# Patient Record
Sex: FEMALE | Race: WHITE | NOT HISPANIC OR LATINO | Employment: FULL TIME | URBAN - METROPOLITAN AREA
[De-identification: names, ages, dates, MRNs, and addresses within clinical notes are randomized per-mention and may not be internally consistent; named-entity substitution may affect disease eponyms.]

---

## 2017-05-18 DIAGNOSIS — R92.2 INCONCLUSIVE MAMMOGRAM: ICD-10-CM

## 2017-05-18 DIAGNOSIS — Z12.31 ENCOUNTER FOR SCREENING MAMMOGRAM FOR MALIGNANT NEOPLASM OF BREAST: ICD-10-CM

## 2017-07-19 ENCOUNTER — ALLSCRIPTS OFFICE VISIT (OUTPATIENT)
Dept: OTHER | Facility: OTHER | Age: 62
End: 2017-07-19

## 2018-01-10 NOTE — MISCELLANEOUS
Message   Recorded as Task   Date: 05/18/2016 09:17 AM, Created By: Jose Junior   Task Name: Miscellaneous   Assigned To: Orest File   Regarding Patient: Juan Cole, Status: Active   Comment:    Jose Junior - 18 May 2016 9:17 AM     TASK CREATED  Breast center called  Pt needs script for l diag mammo and l u/s as well as r routine mammo  Scripts entered        Active Problems    1  Acute breast pain (611 71,338 19) (N64 4,R52)   2  Acute bronchitis (466 0) (J20 9)   3  Diabetes Mellitus (250 00)   4  Diverticulitis of colon (562 11) (K57 32)   5  Encounter for examination following surgery (V67 00) (Z09)   6  Encounter for routine gynecological examination (V72 31) (Z01 419)   7  Encounter for screening mammogram for malignant neoplasm of breast (V76 12)   (Z12 31)   8  Hypertension (401 9) (I10)   9  Hypothyroidism (244 9) (E03 9)   10  Morbid or severe obesity due to excess calories (278 01) (E66 01)   11  Post-menopausal bleeding (627 1) (N95 0)   12  Premenopausal menorrhagia (627 0) (N92 4)    Current Meds   1  Acetaminophen Extra Strength 500 MG Oral Tablet; Therapy: 08Eqq8809 to Recorded   2  Ibuprofen 600 MG Oral Tablet; Therapy: 94Ccc2075 to Recorded   3  Losartan Potassium-HCTZ 50-12 5 MG Oral Tablet; Therapy: 42HUZ1892 to (Last Rx:29Jan2012)  Requested for: 35TKD0079 Ordered   4  Synthroid 112 MCG Oral Tablet (Levothyroxine Sodium); Therapy: 90UXQ4498 to (Last Rx:29Jan2012)  Requested for: 39SER7138 Ordered    Allergies    1  No Known Drug Allergies    Plan  Acute breast pain    · *US BREAST LEFT LIMITED (DIAGNOSTIC); Status:Hold For - [employer]; Requested for:54Ktm8496;    · MAMMO DIAGNOSTIC LEFT W CAD; Status:Hold For - Scheduling,Retrospective  Authorization;  Requested for:72Bme8549;   Acute breast pain, Encounter for screening mammogram for malignant neoplasm of  breast    · MAMMO SCREENING RIGHT W CAD; Status:Hold For - Scheduling,Retrospective  Authorization; Requested for:27Mgn1462;     Signatures   Electronically signed by :  Kaylin Quevedo, ; May 18 2016  9:17AM EST                       (Author)

## 2018-01-11 NOTE — MISCELLANEOUS
Message   Recorded as Task   Date: 11/21/2016 10:31 AM, Created By: Gisela King   Task Name: Go to Result   Assigned To: Alexandro Oliveros   Regarding Patient: Carolina Cruz, Status: In Progress   RashmiLauren Cristina - 21 Nov 2016 10:31 AM     TASK CREATED  please call Neena Tillman- cyst has diminished  needs a routine screening mammogram in 6 months  please order and mail slip   Giselle Anderson - 21 Nov 2016 10:34 AM     TASK IN PROGRESS   Giselle Anderson - 21 Nov 2016 11:11 AM     TASK EDITED  Pt aware   Giselle Anderson - 21 Nov 2016 11:20 AM     TASK EDITED  Mammo slip sent to pt        Active Problems    1  Acute breast pain (611 71,338 19) (N64 4,R52)   2  Acute bronchitis (466 0) (J20 9)   3  Breast cyst (610 0) (N60 09)   4  Diabetes Mellitus (250 00)   5  Diverticulitis of colon (562 11) (K57 32)   6  Encounter for examination following surgery (V67 00) (Q41)   7  Encounter for routine gynecological examination (V72 31) (Z01 419)   8  Encounter for screening mammogram for malignant neoplasm of breast (V76 12)   (Z12 31)   9  Hypertension (401 9) (I10)   10  Hypothyroidism (244 9) (E03 9)   11  Morbid or severe obesity due to excess calories (278 01) (E66 01)   12  Post-menopausal bleeding (627 1) (N95 0)   13  Premenopausal menorrhagia (627 0) (N92 4)    Current Meds   1  Acetaminophen Extra Strength 500 MG Oral Tablet; Therapy: 99Tfz1597 to Recorded   2  Synthroid 112 MCG Oral Tablet (Levothyroxine Sodium); Therapy: 85BLA4020 to (Last Rx:29Jan2012)  Requested for: 52VQA9136 Ordered    Allergies    1  No Known Drug Allergies    Plan  Encounter for screening mammogram for malignant neoplasm of breast    · * MAMMO SCREENING BILATERAL W CAD; Status:Hold For - Scheduling,Retrospective  Authorization; Requested for:[id number];     Signatures   Electronically signed by :  Santa Quevedo, ; Nov 21 2016 11:20AM EST                       (Author)

## 2018-01-13 VITALS
HEIGHT: 59 IN | SYSTOLIC BLOOD PRESSURE: 136 MMHG | DIASTOLIC BLOOD PRESSURE: 74 MMHG | BODY MASS INDEX: 35.08 KG/M2 | WEIGHT: 174 LBS

## 2018-01-17 NOTE — MISCELLANEOUS
Message   Recorded as Task   Date: 05/23/2016 11:16 AM, Created By: Luís Barron   Task Name: Follow Up   Assigned To: Elidia Mcneill   Regarding Patient: Krissy Kam, Status: Active   CommentVance Jain - 23 May 2016 11:16 AM     TASK CREATED  Caller: Self; [phone] (Home); [phone] (Work)  pt said she is returning a call from [employer] [place] 73 sure who called pt i presume it was for results,no active tasks her phone [facility] - 23 May 2016 11:30 AM     TASK EDITED   Vitor Eve - 23 May 2016 11:30 AM     TASK EDITED  lmtcb   Vitor Eve - 23 May 2016 11:39 AM     TASK IN PROGRESS   Vannesa Baker - 24 May 2016 8:19 AM     TASK EDITED        Active Problems    1  Acute breast pain (611 71,338 19) (N64 4,R52)   2  Acute bronchitis (466 0) (J20 9)   3  Breast cyst (610 0) (N60 09)   4  Diabetes Mellitus (250 00)   5  Diverticulitis of colon (562 11) (K57 32)   6  Encounter for examination following surgery (V67 00) (B90)   7  Encounter for routine gynecological examination (V72 31) (Z01 419)   8  Encounter for screening mammogram for malignant neoplasm of breast (V76 12)   (Z12 31)   9  Hypertension (401 9) (I10)   10  Hypothyroidism (244 9) (E03 9)   11  Morbid or severe obesity due to excess calories (278 01) (E66 01)   12  Post-menopausal bleeding (627 1) (N95 0)   13  Premenopausal menorrhagia (627 0) (N92 4)    Current Meds   1  Acetaminophen Extra Strength 500 MG Oral Tablet; Therapy: 38Kiv1927 to Recorded   2  Ibuprofen 600 MG Oral Tablet; Therapy: 94Qde3602 to Recorded   3  Losartan Potassium-HCTZ 50-12 5 MG Oral Tablet; Therapy: 66BRZ0523 to (Last Rx:29Jan2012)  Requested for: 10JLJ4283 Ordered   4  Synthroid 112 MCG Oral Tablet (Levothyroxine Sodium); Therapy: 98TOX9628 to (Last Rx:29Jan2012)  Requested for: 91FQA1897 Ordered    Allergies    1   No Known Drug Allergies    Signatures   Electronically signed by : Sarina Martinez LPN; [place]  1 6540 11:00EK EST                       (Author)

## 2018-01-18 NOTE — MISCELLANEOUS
Message   Recorded as Task   Date: 05/16/2016 09:49 AM, Created By: Selena Sandoval   Task Name: Follow Up   Assigned To: Whit Bender   Regarding Patient: Nury Cardona, Status: Complete   CommentJeko Macedo - 16 May 2016 9:49 AM     TASK CREATED  Caller: Self; [phone] (Home); [phone] (Work)  having abnormal breast pain off and on for a week, [facility] [place] - 16 May 2016 10:01 AM     TASK IN [address] [place] [address],[address] - 16 May 2016 10:05 AM     TASK EDITED  Pt states she is having shooting sharp pain while bending over to her L-breast  started 2 weeks ago, worse this past weekend  denies lumps, redness  Last mammo was 2013? Remedios Gomez - 44 May 2016 5:22 PM     TASK REPLIED TO: Previously Assigned To Remedios Jason  If pain happens with certain positions- maybe more related to muscles or joints but if she hasn't had a mammogram since 2013 we should order one Rhonda Colunga - 17 May 2016 8:20 AM     TASK EDITED  lmtcb  rx sent to EHR  needs to be faxed to patient location  [facility] - 17 May 2016 3:21 PM     TASK EDITED  rx faxed to [facility] for [employer]  pt aware  [facility] - 17 May 2016 3:22 PM     TASK COMPLETED        Active Problems    1  Acute bronchitis (466 0) (J20 9)   2  Diabetes Mellitus (250 00)   3  Diverticulitis of colon (562 11) (K57 32)   4  Encounter for examination following surgery (V67 00) (Z09)   5  Encounter for routine gynecological examination (V72 31) (Z01 419)   6  Encounter for screening mammogram for malignant neoplasm of breast (V76 12)   (Z12 31)   7  Hypertension (401 9) (I10)   8  Hypothyroidism (244 9) (E03 9)   9  Morbid or severe obesity due to excess calories (278 01) (E66 01)   10  Post-menopausal bleeding (627 1) (N95 0)   11  Premenopausal menorrhagia (627 0) (N92 4)    Current Meds   1  Acetaminophen Extra Strength 500 MG Oral Tablet; Therapy: 21Boq1700 to Recorded   2   Ibuprofen 600 MG Oral Tablet; Therapy: 50Lnv9806 to Recorded   3  Losartan Potassium-HCTZ 50-12 5 MG Oral Tablet; Therapy: 68UTC0415 to (Last Rx:29Jan2012)  Requested for: 67JWT0350 Ordered   4  Synthroid 112 MCG Oral Tablet (Levothyroxine Sodium); Therapy: 36ODR2529 to (Last Rx:29Jan2012)  Requested for: 86KLT7390 Ordered    Allergies    1  No Known Drug Allergies    Signatures   Electronically signed by :  William Quevedo, ; May 18 2016  9:13AM EST                       (Author)

## 2019-10-18 ENCOUNTER — TRANSCRIBE ORDERS (OUTPATIENT)
Dept: ADMINISTRATIVE | Facility: HOSPITAL | Age: 64
End: 2019-10-18

## 2019-10-18 ENCOUNTER — HOSPITAL ENCOUNTER (OUTPATIENT)
Dept: RADIOLOGY | Facility: HOSPITAL | Age: 64
Discharge: HOME | End: 2019-10-18
Payer: COMMERCIAL

## 2019-10-18 DIAGNOSIS — R76.12 POSITIVE QUANTIFERON-TB GOLD TEST: Primary | ICD-10-CM

## 2019-10-18 DIAGNOSIS — R76.12 POSITIVE QUANTIFERON-TB GOLD TEST: ICD-10-CM

## 2019-10-18 PROCEDURE — 71046 X-RAY EXAM CHEST 2 VIEWS: CPT

## 2021-03-22 ENCOUNTER — IMMUNIZATIONS (OUTPATIENT)
Dept: FAMILY MEDICINE CLINIC | Facility: HOSPITAL | Age: 66
End: 2021-03-22

## 2021-03-22 DIAGNOSIS — Z23 ENCOUNTER FOR IMMUNIZATION: Primary | ICD-10-CM

## 2021-03-22 PROCEDURE — 91300 SARS-COV-2 / COVID-19 MRNA VACCINE (PFIZER-BIONTECH) 30 MCG: CPT

## 2021-03-22 PROCEDURE — 0001A SARS-COV-2 / COVID-19 MRNA VACCINE (PFIZER-BIONTECH) 30 MCG: CPT

## 2021-04-12 ENCOUNTER — IMMUNIZATIONS (OUTPATIENT)
Dept: FAMILY MEDICINE CLINIC | Facility: HOSPITAL | Age: 66
End: 2021-04-12

## 2021-04-12 DIAGNOSIS — Z23 ENCOUNTER FOR IMMUNIZATION: Primary | ICD-10-CM

## 2021-04-12 PROCEDURE — 91300 SARS-COV-2 / COVID-19 MRNA VACCINE (PFIZER-BIONTECH) 30 MCG: CPT

## 2021-04-12 PROCEDURE — 0002A SARS-COV-2 / COVID-19 MRNA VACCINE (PFIZER-BIONTECH) 30 MCG: CPT

## 2023-09-27 ENCOUNTER — APPOINTMENT (EMERGENCY)
Dept: CT IMAGING | Facility: HOSPITAL | Age: 68
End: 2023-09-27
Payer: MEDICARE

## 2023-09-27 ENCOUNTER — APPOINTMENT (EMERGENCY)
Dept: RADIOLOGY | Facility: HOSPITAL | Age: 68
End: 2023-09-27
Payer: MEDICARE

## 2023-09-27 ENCOUNTER — ANESTHESIA EVENT (INPATIENT)
Dept: PERIOP | Facility: HOSPITAL | Age: 68
End: 2023-09-27
Payer: MEDICARE

## 2023-09-27 ENCOUNTER — HOSPITAL ENCOUNTER (INPATIENT)
Facility: HOSPITAL | Age: 68
LOS: 1 days | Discharge: HOME | End: 2023-09-28
Attending: SURGERY | Admitting: SURGERY
Payer: MEDICARE

## 2023-09-27 ENCOUNTER — APPOINTMENT (EMERGENCY)
Dept: ULTRASOUND IMAGING | Facility: HOSPITAL | Age: 68
End: 2023-09-27
Payer: MEDICARE

## 2023-09-27 ENCOUNTER — HOSPITAL ENCOUNTER (EMERGENCY)
Facility: HOSPITAL | Age: 68
End: 2023-09-27
Attending: INTERNAL MEDICINE
Payer: MEDICARE

## 2023-09-27 ENCOUNTER — ANESTHESIA (INPATIENT)
Dept: PERIOP | Facility: HOSPITAL | Age: 68
End: 2023-09-27
Payer: MEDICARE

## 2023-09-27 VITALS
HEIGHT: 60 IN | DIASTOLIC BLOOD PRESSURE: 77 MMHG | BODY MASS INDEX: 42.8 KG/M2 | WEIGHT: 218 LBS | OXYGEN SATURATION: 99 % | RESPIRATION RATE: 20 BRPM | TEMPERATURE: 98.1 F | HEART RATE: 86 BPM | SYSTOLIC BLOOD PRESSURE: 162 MMHG

## 2023-09-27 DIAGNOSIS — K46.0 OBSTRUCTION CONCURRENT WITH AND DUE TO INTERNAL HERNIA OF ABDOMEN: ICD-10-CM

## 2023-09-27 DIAGNOSIS — G89.18 POSTOPERATIVE PAIN: ICD-10-CM

## 2023-09-27 DIAGNOSIS — R10.9 ABDOMINAL PAIN: Primary | ICD-10-CM

## 2023-09-27 DIAGNOSIS — K45.8 INTERNAL HERNIA: Primary | ICD-10-CM

## 2023-09-27 LAB
ABO GROUP BLD: NORMAL
ALBUMIN SERPL BCP-MCNC: 4 G/DL (ref 3.5–5)
ALP SERPL-CCNC: 93 U/L (ref 34–104)
ALT SERPL W P-5'-P-CCNC: 10 U/L (ref 7–52)
ANION GAP SERPL CALCULATED.3IONS-SCNC: 11 MMOL/L
AST SERPL W P-5'-P-CCNC: 15 U/L (ref 13–39)
ATRIAL RATE: 78 BPM
BACTERIA UR QL AUTO: ABNORMAL /HPF
BASOPHILS # BLD AUTO: 0.04 THOUSANDS/ÂΜL (ref 0–0.1)
BASOPHILS NFR BLD AUTO: 1 % (ref 0–1)
BILIRUB SERPL-MCNC: 0.55 MG/DL (ref 0.2–1)
BILIRUB UR QL STRIP: ABNORMAL
BLD GP AB SCN SERPL QL: NEGATIVE
BUN SERPL-MCNC: 12 MG/DL (ref 5–25)
CALCIUM SERPL-MCNC: 9.4 MG/DL (ref 8.4–10.2)
CHLORIDE SERPL-SCNC: 104 MMOL/L (ref 96–108)
CLARITY UR: ABNORMAL
CO2 SERPL-SCNC: 25 MMOL/L (ref 21–32)
COLOR UR: ABNORMAL
CREAT SERPL-MCNC: 0.58 MG/DL (ref 0.6–1.3)
EOSINOPHIL # BLD AUTO: 0.05 THOUSAND/ÂΜL (ref 0–0.61)
EOSINOPHIL NFR BLD AUTO: 1 % (ref 0–6)
ERYTHROCYTE [DISTWIDTH] IN BLOOD BY AUTOMATED COUNT: 21.6 % (ref 11.6–15.1)
GFR SERPL CREATININE-BSD FRML MDRD: 96 ML/MIN/1.73SQ M
GLUCOSE SERPL-MCNC: 122 MG/DL (ref 65–140)
GLUCOSE UR STRIP-MCNC: NEGATIVE MG/DL
HCT VFR BLD AUTO: 35.7 % (ref 34.8–46.1)
HGB BLD-MCNC: 10.2 G/DL (ref 11.5–15.4)
HGB UR QL STRIP.AUTO: NEGATIVE
IMM GRANULOCYTES # BLD AUTO: 0.02 THOUSAND/UL (ref 0–0.2)
IMM GRANULOCYTES NFR BLD AUTO: 0 % (ref 0–2)
KETONES UR STRIP-MCNC: ABNORMAL MG/DL
LEUKOCYTE ESTERASE UR QL STRIP: NEGATIVE
LIPASE SERPL-CCNC: 26 U/L (ref 11–82)
LYMPHOCYTES # BLD AUTO: 1.05 THOUSANDS/ÂΜL (ref 0.6–4.47)
LYMPHOCYTES NFR BLD AUTO: 12 % (ref 14–44)
MCH RBC QN AUTO: 22.6 PG (ref 26.8–34.3)
MCHC RBC AUTO-ENTMCNC: 28.6 G/DL (ref 31.4–37.4)
MCV RBC AUTO: 79 FL (ref 82–98)
MONOCYTES # BLD AUTO: 0.96 THOUSAND/ÂΜL (ref 0.17–1.22)
MONOCYTES NFR BLD AUTO: 11 % (ref 4–12)
MUCOUS THREADS UR QL AUTO: ABNORMAL
NEUTROPHILS # BLD AUTO: 6.56 THOUSANDS/ÂΜL (ref 1.85–7.62)
NEUTS SEG NFR BLD AUTO: 75 % (ref 43–75)
NITRITE UR QL STRIP: NEGATIVE
NON-SQ EPI CELLS URNS QL MICRO: ABNORMAL /HPF
NRBC BLD AUTO-RTO: 0 /100 WBCS
P AXIS: 69 DEGREES
PH UR STRIP.AUTO: 5.5 [PH]
PLATELET # BLD AUTO: 309 THOUSANDS/UL (ref 149–390)
PMV BLD AUTO: 12.1 FL (ref 8.9–12.7)
POTASSIUM SERPL-SCNC: 3.6 MMOL/L (ref 3.5–5.3)
PR INTERVAL: 152 MS
PROT SERPL-MCNC: 7.2 G/DL (ref 6.4–8.4)
PROT UR STRIP-MCNC: ABNORMAL MG/DL
QRS AXIS: 53 DEGREES
QRSD INTERVAL: 78 MS
QT INTERVAL: 390 MS
QTC INTERVAL: 444 MS
RBC # BLD AUTO: 4.51 MILLION/UL (ref 3.81–5.12)
RBC #/AREA URNS AUTO: ABNORMAL /HPF
RH BLD: POSITIVE
SODIUM SERPL-SCNC: 140 MMOL/L (ref 135–147)
SP GR UR STRIP.AUTO: >=1.03
SPECIMEN EXPIRATION DATE: NORMAL
T WAVE AXIS: 65 DEGREES
UROBILINOGEN UR QL STRIP.AUTO: 0.2 E.U./DL
VENTRICULAR RATE: 78 BPM
WBC # BLD AUTO: 8.68 THOUSAND/UL (ref 4.31–10.16)
WBC #/AREA URNS AUTO: ABNORMAL /HPF

## 2023-09-27 PROCEDURE — 96375 TX/PRO/DX INJ NEW DRUG ADDON: CPT

## 2023-09-27 PROCEDURE — 81001 URINALYSIS AUTO W/SCOPE: CPT | Performed by: INTERNAL MEDICINE

## 2023-09-27 PROCEDURE — 76700 US EXAM ABDOM COMPLETE: CPT

## 2023-09-27 PROCEDURE — C9113 INJ PANTOPRAZOLE SODIUM, VIA: HCPCS | Performed by: INTERNAL MEDICINE

## 2023-09-27 PROCEDURE — 44238 UNLISTED LAPS PX INTESTINE: CPT | Performed by: SURGERY

## 2023-09-27 PROCEDURE — 71045 X-RAY EXAM CHEST 1 VIEW: CPT

## 2023-09-27 PROCEDURE — 83690 ASSAY OF LIPASE: CPT | Performed by: INTERNAL MEDICINE

## 2023-09-27 PROCEDURE — 86850 RBC ANTIBODY SCREEN: CPT | Performed by: INTERNAL MEDICINE

## 2023-09-27 PROCEDURE — 99285 EMERGENCY DEPT VISIT HI MDM: CPT

## 2023-09-27 PROCEDURE — 85025 COMPLETE CBC W/AUTO DIFF WBC: CPT | Performed by: INTERNAL MEDICINE

## 2023-09-27 PROCEDURE — 96376 TX/PRO/DX INJ SAME DRUG ADON: CPT

## 2023-09-27 PROCEDURE — 96361 HYDRATE IV INFUSION ADD-ON: CPT

## 2023-09-27 PROCEDURE — 93010 ELECTROCARDIOGRAM REPORT: CPT | Performed by: INTERNAL MEDICINE

## 2023-09-27 PROCEDURE — 36415 COLL VENOUS BLD VENIPUNCTURE: CPT | Performed by: INTERNAL MEDICINE

## 2023-09-27 PROCEDURE — 0DN84ZZ RELEASE SMALL INTESTINE, PERCUTANEOUS ENDOSCOPIC APPROACH: ICD-10-PCS | Performed by: SURGERY

## 2023-09-27 PROCEDURE — 86901 BLOOD TYPING SEROLOGIC RH(D): CPT | Performed by: INTERNAL MEDICINE

## 2023-09-27 PROCEDURE — 86900 BLOOD TYPING SEROLOGIC ABO: CPT | Performed by: INTERNAL MEDICINE

## 2023-09-27 PROCEDURE — 86850 RBC ANTIBODY SCREEN: CPT | Performed by: ANESTHESIOLOGY

## 2023-09-27 PROCEDURE — G1004 CDSM NDSC: HCPCS

## 2023-09-27 PROCEDURE — 74177 CT ABD & PELVIS W/CONTRAST: CPT

## 2023-09-27 PROCEDURE — 99285 EMERGENCY DEPT VISIT HI MDM: CPT | Performed by: INTERNAL MEDICINE

## 2023-09-27 PROCEDURE — 44238 UNLISTED LAPS PX INTESTINE: CPT | Performed by: STUDENT IN AN ORGANIZED HEALTH CARE EDUCATION/TRAINING PROGRAM

## 2023-09-27 PROCEDURE — 96374 THER/PROPH/DIAG INJ IV PUSH: CPT

## 2023-09-27 PROCEDURE — 99223 1ST HOSP IP/OBS HIGH 75: CPT | Performed by: STUDENT IN AN ORGANIZED HEALTH CARE EDUCATION/TRAINING PROGRAM

## 2023-09-27 PROCEDURE — 86901 BLOOD TYPING SEROLOGIC RH(D): CPT | Performed by: ANESTHESIOLOGY

## 2023-09-27 PROCEDURE — 0DJ08ZZ INSPECTION OF UPPER INTESTINAL TRACT, VIA NATURAL OR ARTIFICIAL OPENING ENDOSCOPIC: ICD-10-PCS | Performed by: SURGERY

## 2023-09-27 PROCEDURE — 86900 BLOOD TYPING SEROLOGIC ABO: CPT | Performed by: ANESTHESIOLOGY

## 2023-09-27 PROCEDURE — 93005 ELECTROCARDIOGRAM TRACING: CPT

## 2023-09-27 PROCEDURE — 71260 CT THORAX DX C+: CPT

## 2023-09-27 PROCEDURE — 80053 COMPREHEN METABOLIC PANEL: CPT | Performed by: INTERNAL MEDICINE

## 2023-09-27 RX ORDER — HEPARIN SODIUM 5000 [USP'U]/ML
5000 INJECTION, SOLUTION INTRAVENOUS; SUBCUTANEOUS EVERY 8 HOURS SCHEDULED
Status: DISCONTINUED | OUTPATIENT
Start: 2023-09-27 | End: 2023-09-28 | Stop reason: HOSPADM

## 2023-09-27 RX ORDER — FENTANYL CITRATE 50 UG/ML
50 INJECTION, SOLUTION INTRAMUSCULAR; INTRAVENOUS ONCE
Status: COMPLETED | OUTPATIENT
Start: 2023-09-27 | End: 2023-09-27

## 2023-09-27 RX ORDER — SUCCINYLCHOLINE/SOD CL,ISO/PF 100 MG/5ML
SYRINGE (ML) INTRAVENOUS AS NEEDED
Status: DISCONTINUED | OUTPATIENT
Start: 2023-09-27 | End: 2023-09-28

## 2023-09-27 RX ORDER — PROPOFOL 10 MG/ML
INJECTION, EMULSION INTRAVENOUS AS NEEDED
Status: DISCONTINUED | OUTPATIENT
Start: 2023-09-27 | End: 2023-09-28

## 2023-09-27 RX ORDER — FENTANYL CITRATE 50 UG/ML
INJECTION, SOLUTION INTRAMUSCULAR; INTRAVENOUS AS NEEDED
Status: DISCONTINUED | OUTPATIENT
Start: 2023-09-27 | End: 2023-09-28

## 2023-09-27 RX ORDER — LEVOTHYROXINE SODIUM 0.15 MG/1
TABLET ORAL
COMMUNITY
Start: 2023-09-05

## 2023-09-27 RX ORDER — METOCLOPRAMIDE HYDROCHLORIDE 5 MG/ML
10 INJECTION INTRAMUSCULAR; INTRAVENOUS ONCE
Status: COMPLETED | OUTPATIENT
Start: 2023-09-27 | End: 2023-09-27

## 2023-09-27 RX ORDER — FENTANYL CITRATE 50 UG/ML
25 INJECTION, SOLUTION INTRAMUSCULAR; INTRAVENOUS ONCE
Status: COMPLETED | OUTPATIENT
Start: 2023-09-27 | End: 2023-09-27

## 2023-09-27 RX ORDER — SODIUM CHLORIDE, SODIUM LACTATE, POTASSIUM CHLORIDE, CALCIUM CHLORIDE 600; 310; 30; 20 MG/100ML; MG/100ML; MG/100ML; MG/100ML
75 INJECTION, SOLUTION INTRAVENOUS CONTINUOUS
Status: DISCONTINUED | OUTPATIENT
Start: 2023-09-27 | End: 2023-09-28 | Stop reason: HOSPADM

## 2023-09-27 RX ORDER — MIDAZOLAM HYDROCHLORIDE 2 MG/2ML
INJECTION, SOLUTION INTRAMUSCULAR; INTRAVENOUS AS NEEDED
Status: DISCONTINUED | OUTPATIENT
Start: 2023-09-27 | End: 2023-09-28

## 2023-09-27 RX ORDER — LIDOCAINE HYDROCHLORIDE 10 MG/ML
INJECTION, SOLUTION EPIDURAL; INFILTRATION; INTRACAUDAL; PERINEURAL AS NEEDED
Status: DISCONTINUED | OUTPATIENT
Start: 2023-09-27 | End: 2023-09-28

## 2023-09-27 RX ORDER — ONDANSETRON 2 MG/ML
4 INJECTION INTRAMUSCULAR; INTRAVENOUS EVERY 6 HOURS PRN
Status: DISCONTINUED | OUTPATIENT
Start: 2023-09-27 | End: 2023-09-28 | Stop reason: HOSPADM

## 2023-09-27 RX ORDER — DEXAMETHASONE SODIUM PHOSPHATE 10 MG/ML
INJECTION, SOLUTION INTRAMUSCULAR; INTRAVENOUS AS NEEDED
Status: DISCONTINUED | OUTPATIENT
Start: 2023-09-27 | End: 2023-09-28

## 2023-09-27 RX ORDER — ONDANSETRON 2 MG/ML
4 INJECTION INTRAMUSCULAR; INTRAVENOUS ONCE
Status: COMPLETED | OUTPATIENT
Start: 2023-09-27 | End: 2023-09-27

## 2023-09-27 RX ORDER — ROCURONIUM BROMIDE 10 MG/ML
INJECTION, SOLUTION INTRAVENOUS AS NEEDED
Status: DISCONTINUED | OUTPATIENT
Start: 2023-09-27 | End: 2023-09-28

## 2023-09-27 RX ORDER — ACETAMINOPHEN 500 MG
TABLET ORAL
COMMUNITY
Start: 2014-04-16

## 2023-09-27 RX ORDER — OXYCODONE HCL 5 MG/5 ML
10 SOLUTION, ORAL ORAL EVERY 4 HOURS PRN
Status: DISCONTINUED | OUTPATIENT
Start: 2023-09-27 | End: 2023-09-28 | Stop reason: HOSPADM

## 2023-09-27 RX ORDER — LEVOTHYROXINE SODIUM 175 UG/1
TABLET ORAL
COMMUNITY
Start: 2023-09-05

## 2023-09-27 RX ORDER — CEFAZOLIN SODIUM 2 G/50ML
2000 SOLUTION INTRAVENOUS
Status: COMPLETED | OUTPATIENT
Start: 2023-09-28 | End: 2023-09-27

## 2023-09-27 RX ORDER — PANTOPRAZOLE SODIUM 40 MG/10ML
40 INJECTION, POWDER, LYOPHILIZED, FOR SOLUTION INTRAVENOUS ONCE
Status: COMPLETED | OUTPATIENT
Start: 2023-09-27 | End: 2023-09-27

## 2023-09-27 RX ORDER — GABAPENTIN 300 MG/1
CAPSULE ORAL
COMMUNITY
Start: 2023-09-05

## 2023-09-27 RX ORDER — SODIUM CHLORIDE 9 MG/ML
125 INJECTION, SOLUTION INTRAVENOUS CONTINUOUS
Status: DISCONTINUED | OUTPATIENT
Start: 2023-09-27 | End: 2023-09-28

## 2023-09-27 RX ORDER — SODIUM CHLORIDE 9 MG/ML
150 INJECTION, SOLUTION INTRAVENOUS CONTINUOUS
Status: DISCONTINUED | OUTPATIENT
Start: 2023-09-27 | End: 2023-09-27 | Stop reason: HOSPADM

## 2023-09-27 RX ORDER — ACETAMINOPHEN 160 MG/5ML
650 SUSPENSION ORAL EVERY 6 HOURS PRN
Status: DISCONTINUED | OUTPATIENT
Start: 2023-09-27 | End: 2023-09-28 | Stop reason: HOSPADM

## 2023-09-27 RX ADMIN — HEPARIN SODIUM 5000 UNITS: 5000 INJECTION INTRAVENOUS; SUBCUTANEOUS at 23:51

## 2023-09-27 RX ADMIN — LIDOCAINE HYDROCHLORIDE 60 MG: 10 INJECTION, SOLUTION EPIDURAL; INFILTRATION; INTRACAUDAL; PERINEURAL at 23:38

## 2023-09-27 RX ADMIN — ONDANSETRON 4 MG: 2 INJECTION INTRAMUSCULAR; INTRAVENOUS at 16:02

## 2023-09-27 RX ADMIN — CEFAZOLIN SODIUM 2000 MG: 2 SOLUTION INTRAVENOUS at 23:42

## 2023-09-27 RX ADMIN — ROCURONIUM BROMIDE 45 MG: 10 INJECTION, SOLUTION INTRAVENOUS at 23:57

## 2023-09-27 RX ADMIN — SODIUM CHLORIDE 125 ML/HR: 0.9 INJECTION, SOLUTION INTRAVENOUS at 23:09

## 2023-09-27 RX ADMIN — ONDANSETRON 4 MG: 2 INJECTION INTRAMUSCULAR; INTRAVENOUS at 17:03

## 2023-09-27 RX ADMIN — FENTANYL CITRATE 50 MCG: 50 INJECTION, SOLUTION INTRAMUSCULAR; INTRAVENOUS at 16:02

## 2023-09-27 RX ADMIN — IOHEXOL 100 ML: 350 INJECTION, SOLUTION INTRAVENOUS at 18:46

## 2023-09-27 RX ADMIN — MIDAZOLAM 2 MG: 1 INJECTION INTRAMUSCULAR; INTRAVENOUS at 23:38

## 2023-09-27 RX ADMIN — PROPOFOL 200 MG: 10 INJECTION, EMULSION INTRAVENOUS at 23:38

## 2023-09-27 RX ADMIN — METOCLOPRAMIDE 10 MG: 5 INJECTION, SOLUTION INTRAMUSCULAR; INTRAVENOUS at 17:53

## 2023-09-27 RX ADMIN — FENTANYL CITRATE 100 MCG: 50 INJECTION INTRAMUSCULAR; INTRAVENOUS at 23:38

## 2023-09-27 RX ADMIN — SODIUM CHLORIDE 1000 ML: 0.9 INJECTION, SOLUTION INTRAVENOUS at 16:07

## 2023-09-27 RX ADMIN — DEXAMETHASONE SODIUM PHOSPHATE 10 MG: 10 INJECTION INTRAMUSCULAR; INTRAVENOUS at 23:57

## 2023-09-27 RX ADMIN — PANTOPRAZOLE SODIUM 40 MG: 40 INJECTION, POWDER, FOR SOLUTION INTRAVENOUS at 17:47

## 2023-09-27 RX ADMIN — ROCURONIUM BROMIDE 5 MG: 10 INJECTION, SOLUTION INTRAVENOUS at 23:43

## 2023-09-27 RX ADMIN — FENTANYL CITRATE 25 MCG: 50 INJECTION, SOLUTION INTRAMUSCULAR; INTRAVENOUS at 17:47

## 2023-09-27 RX ADMIN — Medication 100 MG: at 23:43

## 2023-09-27 NOTE — ED PROVIDER NOTES
History  Chief Complaint   Patient presents with   • Abdominal Pain     Vomiting and diarrhea since last night. 71-year-old female with a history of gastric bypass was eating popcorn on Monday. She started with intense abdominal spasms in the epigastric area right upper quadrant. The been coming and going since that time, now 48 hours. She also began vomiting, and she vomited up food from days ago. She notes no fever that she is aware of. Denies headache. No radiation of the pain, it seems to be on the abdomen. She seems reporting mostly to the upper abdomen but then will will get to her lower quadrants as well. She still has her gallbladder and appendix are intact. She has a history of diverticulitis in the past.  She did not have surgery for the diverticulitis. Her gastric bypass was in  at [facility].          Prior to Admission Medications   Prescriptions Last Dose Informant Patient Reported? Taking?    Diclofenac Sodium (VOLTAREN) 1 %   Yes No   Sig: Apply 1 Application topically 4 (four) times a day   acetaminophen (TYLENOL) 500 mg tablet   Yes Yes   Sig: Take by mouth   gabapentin (NEURONTIN) 300 mg capsule   Yes Yes   Si tab po qhs prn   levothyroxine 150 mcg tablet   Yes Yes   Si tab po on Sat and    levothyroxine 175 mcg tablet   Yes Yes   Si tab po from Monday to Friday      Facility-Administered Medications: None       Past Medical History:   Diagnosis Date   • Anemia    • Diverticulitis    • H/O bariatric surgery    • Hypertension    • Morbid obesity with BMI of 40.0-44.9, adult (HCC)        Past Surgical History:   Procedure Laterality Date   • CARPAL TUNNEL RELEASE     •  SECTION     • GASTRIC BYPASS     • HYSTERECTOMY      TOTAL       Family History   Problem Relation Age of Onset   • Allergies Father    • Diabetes Family         MELLITUS   • Hypertension Family    • Obesity Family    • Breast cancer Family      I have reviewed and agree with the history as documented. E-Cigarette/Vaping   • E-Cigarette Use Never User      E-Cigarette/Vaping Substances     Social History     Tobacco Use   • Smoking status: Never   • Tobacco comments:     NO TOBACCO USE   Vaping Use   • Vaping Use: Never used   Substance Use Topics   • Alcohol use: Never   • Drug use: Never       Review of Systems   Constitutional: Negative for chills and fever. HENT: Negative for rhinorrhea and sore throat. Eyes: Negative for visual disturbance. Respiratory: Negative for cough and shortness of breath. Cardiovascular: Negative for chest pain and leg swelling. Gastrointestinal: Positive for abdominal pain, nausea and vomiting. Negative for diarrhea. Genitourinary: Negative for dysuria. Musculoskeletal: Negative for back pain and myalgias. Skin: Negative for rash. Neurological: Negative for dizziness and headaches. Psychiatric/Behavioral: Negative for confusion. All other systems reviewed and are negative. Physical Exam  Physical Exam  Vitals and nursing note reviewed. Constitutional:       Appearance: She is well-developed. She is obese. She is ill-appearing. HENT:      Nose: Nose normal.      Mouth/Throat:      Pharynx: No oropharyngeal exudate. Eyes:      General: No scleral icterus. Conjunctiva/sclera: Conjunctivae normal.      Pupils: Pupils are equal, round, and reactive to light. Neck:      Vascular: No JVD. Trachea: No tracheal deviation. Cardiovascular:      Rate and Rhythm: Normal rate and regular rhythm. Heart sounds: Normal heart sounds. No murmur heard. Pulmonary:      Effort: Pulmonary effort is normal. No respiratory distress. Breath sounds: Normal breath sounds. No wheezing or rales. Abdominal:      General: Bowel sounds are normal.      Palpations: Abdomen is soft. Tenderness: There is no abdominal tenderness. There is no guarding. Musculoskeletal:         General: No tenderness. Normal range of motion. Cervical back: Normal range of motion and neck supple. Skin:     General: Skin is warm and dry. Coloration: Skin is pale. Neurological:      Mental Status: She is alert and oriented to person, place, and time. Cranial Nerves: No cranial nerve deficit. Sensory: No sensory deficit. Motor: No abnormal muscle tone.       Comments: 5/5 motor, nl sens   Psychiatric:         Behavior: Behavior normal.         Vital Signs  ED Triage Vitals [09/27/23 1534]   Temperature Pulse Respirations Blood Pressure SpO2   98.1 °F (36.7 °C) 86 20 162/77 99 %      Temp Source Heart Rate Source Patient Position - Orthostatic VS BP Location FiO2 (%)   Oral -- -- -- --      Pain Score       8           Vitals:    09/27/23 1534   BP: 162/77   Pulse: 86         Visual Acuity      ED Medications  Medications   ondansetron (ZOFRAN) injection 4 mg (4 mg Intravenous Given 9/27/23 1602)   fentanyl citrate (PF) 100 MCG/2ML 50 mcg (50 mcg Intravenous Given 9/27/23 1602)   sodium chloride 0.9 % bolus 1,000 mL (0 mL Intravenous Stopped 9/27/23 1747)   ondansetron (ZOFRAN) injection 4 mg (4 mg Intravenous Given 9/27/23 1703)   pantoprazole (PROTONIX) injection 40 mg (40 mg Intravenous Given 9/27/23 1747)   fentanyl citrate (PF) 100 MCG/2ML 25 mcg (25 mcg Intravenous Given 9/27/23 1747)   metoclopramide (REGLAN) injection 10 mg (10 mg Intravenous Given 9/27/23 1753)   iohexol (OMNIPAQUE) 350 MG/ML injection (MULTI-DOSE) 100 mL (100 mL Intravenous Given 9/27/23 1846)   iohexol (OMNIPAQUE) 240 MG/ML solution 50 mL (50 mL Oral Given 9/27/23 1846)       Diagnostic Studies  Results Reviewed     Procedure Component Value Units Date/Time    CMP [[id number]]  (Abnormal) Collected: 09/27/23 1557    Lab Status: Final result Specimen: Blood from Arm, Right Updated: 09/27/23 1626     Sodium 140 mmol/L      Potassium 3.6 mmol/L      Chloride 104 mmol/L      CO2 25 mmol/L      ANION GAP 11 mmol/L      BUN 12 mg/dL      Creatinine 0.58 mg/dL Glucose 122 mg/dL      Calcium 9.4 mg/dL      AST 15 U/L      ALT 10 U/L      Alkaline Phosphatase 93 U/L      Total Protein 7.2 g/dL      Albumin 4.0 g/dL      Total Bilirubin 0.55 mg/dL      eGFR 96 ml/min/1.73sq m     Narrative:      [facility] (CKD):   •  Stage 1 with normal or high GFR (GFR > 90 mL/min/1.73 square meters)  •  Stage 2 Mild CKD (GFR = 60-89 mL/min/1.73 square meters)  •  Stage 3A Moderate CKD (GFR = 45-59 mL/min/1.73 square meters)  •  Stage 3B Moderate CKD (GFR = 30-44 mL/min/1.73 square meters)  •  Stage 4 Severe CKD (GFR = 15-29 mL/min/1.73 square meters)  •  Stage 5 End Stage CKD (GFR <15 mL/min/1.73 square meters)  Note: GFR calculation is accurate only with a steady state creatinine    Lipase [[id number]]  (Normal) Collected: 09/27/23 1557    Lab Status: Final result Specimen: Blood from Arm, Right Updated: 09/27/23 1626     Lipase 26 u/L     Urine Microscopic [[id number]]  (Abnormal) Collected: 09/27/23 1557    Lab Status: Final result Specimen: Urine, Clean Catch Updated: 09/27/23 1621     RBC, UA None Seen /hpf      WBC, UA 0-1 /hpf      Epithelial Cells Moderate /hpf      Bacteria, UA Occasional /hpf      MUCUS THREADS Moderate    UA w Reflex to Microscopic w Reflex to Culture [[id number]]  (Abnormal) Collected: 09/27/23 1557    Lab Status: Final result Specimen: Urine, Clean Catch Updated: 09/27/23 1606     Color, UA Genoveva     Clarity, UA Slightly Cloudy     Specific Gravity, UA >=1.030     pH, UA 5.5     Leukocytes, UA Negative     Nitrite, UA Negative     Protein, UA Trace mg/dl      Glucose, UA Negative mg/dl      Ketones, UA 40 (2+) mg/dl      Urobilinogen, UA 0.2 E.U./dl      Bilirubin, UA 2+     Occult Blood, UA Negative    CBC and differential [[id number]]  (Abnormal) Collected: 09/27/23 1557    Lab Status: Final result Specimen: Blood from Arm, Right Updated: 09/27/23 1605     WBC 8.68 Thousand/uL      RBC 4.51 Million/uL      Hemoglobin 10.2 g/dL      Hematocrit 35.7 %      MCV 79 fL      MCH 22.6 pg      MCHC 28.6 g/dL      RDW 21.6 %      MPV 12.1 fL      Platelets 309 Thousands/uL      nRBC 0 /100 WBCs      Neutrophils Relative 75 %      Immat GRANS % 0 %      Lymphocytes Relative 12 %      Monocytes Relative 11 %      Eosinophils Relative 1 %      Basophils Relative 1 %      Neutrophils Absolute 6.56 Thousands/µL      Immature Grans Absolute 0.02 Thousand/uL      Lymphocytes Absolute 1.05 Thousands/µL      Monocytes Absolute 0.96 Thousand/µL      Eosinophils Absolute 0.05 Thousand/µL      Basophils Absolute 0.04 Thousands/µL                  CT chest abdomen pelvis w contrast   Final Result by Shy Polanco MD (09/27 1944)      Findings concerning for closed-loop obstruction/internal hernia involving an approximately 15 cm segment of Ilia limb immediately proximal to patient's jejunojejunostomy. Findings discussed with Dr. Marquita Mcardle at  pm, 9/27/23            Workstation performed: [id number]         US abdomen complete   Final Result by Janet Paz MD (09/27 1703)      Borderline enlarged liver.                   Workstation performed: [id number]         XR chest 1 view portable    (Results Pending)              Procedures  ECG 12 Lead Documentation Only    Date/Time: 9/27/2023 4:25 PM    Performed by: Ingrid Coello DO  Authorized by: Ingrid Coello DO    Indications / Diagnosis:  Abdominal pain  ECG reviewed by me, the ED Provider: yes    Patient location:  ED  Previous ECG:     Previous ECG:  Unavailable    Comparison to cardiac monitor: Yes    Interpretation:     Interpretation: normal    Rate:     ECG rate:  78    ECG rate assessment: normal    Rhythm:     Rhythm: sinus rhythm    Ectopy:     Ectopy: PAC    QRS:     QRS axis:  Normal    QRS intervals:  Normal  Conduction:     Conduction: normal    ST segments:     ST segments:  Normal  T waves:     T waves: normal               ED Course  ED Course as of 09/27/23 2310   Wed Sep 27, 2023   1646 Patient is feeling better, but somewhat more nauseous. Likely related to the pain medication. Liver functions appear normal, and white count also normal.  May need CAT scan. We will heed ultrasound of the abdomen report   1719 Pain is begin to escalate again for her. Will retreat. Ultrasound unremarkable. We will proceed with CAT scan, gastric bypass protocol   1747 No active disease in the chest   2031 Internal hernia noted on CAT scan, arranging transfer downtown for bariatric intervention. She recently recalls her bariatric surgery was in 2015 in [facility]. 2112 Discussion with the bariatric surgeons, wants her transferred down to [facility]. Concerns for bowel ischemia make this prior to transfer. Medical Decision Making  53-year-old female with a history of gastric bypass was eating popcorn on Monday. She started with intense abdominal spasms in the epigastric area right upper quadrant. The been coming and going since that time, now 48 hours. She also began vomiting, and she vomited up food from days ago. She notes no fever that she is aware of. Denies headache. No radiation of the pain, it seems to be on the abdomen. She seems reporting mostly to the upper abdomen but then will will get to her lower quadrants as well. She still has her gallbladder and appendix are intact. She has a history of diverticulitis in the past.  She did not have surgery for the diverticulitis. Differential includes cholecystitis, peptic ulcer disease, internal hernia related to her gastric bypass past surgery, diverticulitis, or other intra-abdominal catastrophe.     Abdominal pain: acute illness or injury     Details: Lab data was fairly unremarkable however the CAT scan revealing the internal hernia, discussed case with metabolic surgeons recommended to be transferred given the internal hernia of the Ilia-en-Y. Obstruction concurrent with and due to internal hernia of abdomen: acute illness or injury     Details: As above, and while the patient is comfortable she is at high risk for ischemia being this priority 1 transfer. Amount and/or Complexity of Data Reviewed  External Data Reviewed: labs and radiology. Details: Prior imaging evaluations of patient were noted on chart. Labs: ordered. Details: Lab data was fairly stable. Anemia is noted. No elevation in her white count however. No acidosis. Radiology: ordered. Details: CAT scan revealing the internal hernia requiring surgical intervention  ECG/medicine tests: ordered. Details: Was ordered and independently reviewed by B. Revealing normal sinus rhythm with occasional PAC. Otherwise normal EKG at a rate of 78  Discussion of management or test interpretation with external provider(s): Discussed case with bariatric surgeon on-call, patient subsequently had been admitted to the bariatric surgical team under Dr Yina Crandall, with likely need for surgery tonight. Risk  Prescription drug management. Decision regarding hospitalization. Emergency major surgery. Risk Details: Given the potential for ischemic gut, patient was transferred to bariatric surgery, requiring possible surgery tonight depending on her evaluation there. Spoke with family with regards to need to transfer as well as need for potential surgery. Discussed pros and cons of this decision,.         Disposition  Final diagnoses:   Abdominal pain   Obstruction concurrent with and due to internal hernia of abdomen     Time reflects when diagnosis was documented in both MDM as applicable and the Disposition within this note     Time User Action Codes Description Comment    9/27/2023  9:11 PM Arcelia Salinas Add [R10.9] Abdominal pain     9/27/2023  9:11 PM Arcelia Salinas Add [K45.8] Internal hernia     9/27/2023  9:11 PM Arcelia Salinas Add [K46.0] Obstruction concurrent with and due to internal hernia of abdomen     9/27/2023  9:11 PM Carroll Patel Remove [K45.8] Internal hernia       ED Disposition     ED Disposition   Transfer to Another Facility-In Network    Condition   --    Date/Time   Wed Sep 27, 2023  9:11 PM    Comment   Grace Gonsales should be transferred out to [address] Recent Value   Patient Condition The patient has been stabilized such that within reasonable medical probability, no material deterioration of the patient condition or the condition of the unborn child(tyler) is likely to result from the transfer   Reason for Transfer Level of Care needed not available at this facility  [Bariatric surgery]   Benefits of Transfer Specialized equipment and/or services available at the receiving facility (Include comment)________________________   Risks of Transfer Potential for delay in receiving treatment   Accepting Physician Vinny Gallardo    (Name & Tel number) Kenyon Monroy   Sending MD Dr. Maya Van   Provider Certification General risk, such as traffic hazards, adverse weather conditions, rough terrain or turbulence, possible failure of equipment (including vehicle or aircraft), or consequences of actions of persons outside the control of the transport personnel, Unanticipated needs of medical equipment and personnel during transport, Risk of worsening condition, The possibility of a transport vehicle being unavailable      RN Documentation    Flowsheet Row Most [facility] Name, [place]    (Name & Tel number) Kenyon Monroy      Follow-up Information    None         Discharge Medication List as of 9/27/2023  9:44 PM      CONTINUE these medications which have NOT CHANGED    Details   acetaminophen (TYLENOL) 500 mg tablet Take by mouth, Starting Wed 4/16/2014, Historical Med gabapentin (NEURONTIN) 300 mg capsule 1 tab po qhs prn, Historical Med      !! levothyroxine 150 mcg tablet 1 tab po on Sat and sunday, Historical Med      !! levothyroxine 175 mcg tablet 1 tab po from Monday to Friday, Historical Med      Diclofenac Sodium (VOLTAREN) 1 % Apply 1 Application topically 4 (four) times a day, Starting Tue 9/27/2022, Until Wed 9/27/2023, Historical Med       !! - Potential duplicate medications found. Please discuss with provider. No discharge procedures on file.     PDMP Review     None          ED Provider  Electronically Signed by           Jenna Marcial DO  09/27/23 0755

## 2023-09-28 VITALS
HEART RATE: 83 BPM | BODY MASS INDEX: 42.8 KG/M2 | WEIGHT: 218 LBS | SYSTOLIC BLOOD PRESSURE: 145 MMHG | RESPIRATION RATE: 18 BRPM | TEMPERATURE: 98.2 F | DIASTOLIC BLOOD PRESSURE: 73 MMHG | OXYGEN SATURATION: 90 % | HEIGHT: 60 IN

## 2023-09-28 PROBLEM — K45.8 INTERNAL HERNIA: Status: ACTIVE | Noted: 2023-09-28

## 2023-09-28 LAB
ABO GROUP BLD: NORMAL
ANION GAP SERPL CALCULATED.3IONS-SCNC: 6 MMOL/L
BLD GP AB SCN SERPL QL: NEGATIVE
BUN SERPL-MCNC: 12 MG/DL (ref 5–25)
CALCIUM SERPL-MCNC: 8.5 MG/DL (ref 8.4–10.2)
CHLORIDE SERPL-SCNC: 110 MMOL/L (ref 96–108)
CO2 SERPL-SCNC: 24 MMOL/L (ref 21–32)
CREAT SERPL-MCNC: 0.49 MG/DL (ref 0.6–1.3)
ERYTHROCYTE [DISTWIDTH] IN BLOOD BY AUTOMATED COUNT: 21.6 % (ref 11.6–15.1)
GFR SERPL CREATININE-BSD FRML MDRD: 101 ML/MIN/1.73SQ M
GLUCOSE SERPL-MCNC: 143 MG/DL (ref 65–140)
HCT VFR BLD AUTO: 31.9 % (ref 34.8–46.1)
HGB BLD-MCNC: 9.1 G/DL (ref 11.5–15.4)
MCH RBC QN AUTO: 22.6 PG (ref 26.8–34.3)
MCHC RBC AUTO-ENTMCNC: 28.5 G/DL (ref 31.4–37.4)
MCV RBC AUTO: 79 FL (ref 82–98)
PLATELET # BLD AUTO: 247 THOUSANDS/UL (ref 149–390)
PMV BLD AUTO: 11.9 FL (ref 8.9–12.7)
POTASSIUM SERPL-SCNC: 3.8 MMOL/L (ref 3.5–5.3)
RBC # BLD AUTO: 4.02 MILLION/UL (ref 3.81–5.12)
RH BLD: POSITIVE
SODIUM SERPL-SCNC: 140 MMOL/L (ref 135–147)
SPECIMEN EXPIRATION DATE: NORMAL
WBC # BLD AUTO: 9.18 THOUSAND/UL (ref 4.31–10.16)

## 2023-09-28 PROCEDURE — 80048 BASIC METABOLIC PNL TOTAL CA: CPT | Performed by: STUDENT IN AN ORGANIZED HEALTH CARE EDUCATION/TRAINING PROGRAM

## 2023-09-28 PROCEDURE — 85027 COMPLETE CBC AUTOMATED: CPT | Performed by: STUDENT IN AN ORGANIZED HEALTH CARE EDUCATION/TRAINING PROGRAM

## 2023-09-28 PROCEDURE — C9290 INJ, BUPIVACAINE LIPOSOME: HCPCS | Performed by: STUDENT IN AN ORGANIZED HEALTH CARE EDUCATION/TRAINING PROGRAM

## 2023-09-28 PROCEDURE — 99024 POSTOP FOLLOW-UP VISIT: CPT | Performed by: SURGERY

## 2023-09-28 PROCEDURE — NC001 PR NO CHARGE: Performed by: SURGERY

## 2023-09-28 RX ORDER — ONDANSETRON 2 MG/ML
INJECTION INTRAMUSCULAR; INTRAVENOUS AS NEEDED
Status: DISCONTINUED | OUTPATIENT
Start: 2023-09-28 | End: 2023-09-28

## 2023-09-28 RX ORDER — MAGNESIUM HYDROXIDE 1200 MG/15ML
LIQUID ORAL AS NEEDED
Status: DISCONTINUED | OUTPATIENT
Start: 2023-09-28 | End: 2023-09-28 | Stop reason: HOSPADM

## 2023-09-28 RX ORDER — ONDANSETRON 2 MG/ML
4 INJECTION INTRAMUSCULAR; INTRAVENOUS ONCE AS NEEDED
Status: DISCONTINUED | OUTPATIENT
Start: 2023-09-28 | End: 2023-09-28 | Stop reason: HOSPADM

## 2023-09-28 RX ORDER — OXYCODONE HYDROCHLORIDE 5 MG/1
5 TABLET ORAL EVERY 4 HOURS PRN
Qty: 5 TABLET | Refills: 0 | Status: SHIPPED | OUTPATIENT
Start: 2023-09-28 | End: 2023-10-08

## 2023-09-28 RX ORDER — FENTANYL CITRATE/PF 50 MCG/ML
50 SYRINGE (ML) INJECTION
Status: DISCONTINUED | OUTPATIENT
Start: 2023-09-28 | End: 2023-09-28 | Stop reason: HOSPADM

## 2023-09-28 RX ORDER — HYDROMORPHONE HCL/PF 1 MG/ML
0.5 SYRINGE (ML) INJECTION
Status: DISCONTINUED | OUTPATIENT
Start: 2023-09-28 | End: 2023-09-28 | Stop reason: HOSPADM

## 2023-09-28 RX ORDER — LABETALOL HYDROCHLORIDE 5 MG/ML
10 INJECTION, SOLUTION INTRAVENOUS ONCE
Status: COMPLETED | OUTPATIENT
Start: 2023-09-28 | End: 2023-09-28

## 2023-09-28 RX ORDER — BUPIVACAINE HYDROCHLORIDE 5 MG/ML
INJECTION, SOLUTION EPIDURAL; INTRACAUDAL AS NEEDED
Status: DISCONTINUED | OUTPATIENT
Start: 2023-09-28 | End: 2023-09-28 | Stop reason: HOSPADM

## 2023-09-28 RX ADMIN — SUGAMMADEX 200 MG: 100 INJECTION, SOLUTION INTRAVENOUS at 00:29

## 2023-09-28 RX ADMIN — HEPARIN SODIUM 5000 UNITS: 5000 INJECTION INTRAVENOUS; SUBCUTANEOUS at 05:55

## 2023-09-28 RX ADMIN — SODIUM CHLORIDE, SODIUM LACTATE, POTASSIUM CHLORIDE, AND CALCIUM CHLORIDE 75 ML/HR: .6; .31; .03; .02 INJECTION, SOLUTION INTRAVENOUS at 01:12

## 2023-09-28 RX ADMIN — ONDANSETRON 4 MG: 2 INJECTION INTRAMUSCULAR; INTRAVENOUS at 00:29

## 2023-09-28 RX ADMIN — Medication 10 MG: at 01:28

## 2023-09-28 NOTE — DISCHARGE INSTR - AVS FIRST PAGE
Bariatric/Weight Loss Surgery  Hospital Discharge Instructions  ACTIVITY:  Progress as feels comfortable - a good rule is:  if you are doing something and it begins to hurt, stop doing the activity. Walk every hour while at home. You may walk stairs if you do so slowly  You may shower 48 hours after surgery. Use your incentive spirometer 10 times per hour while awake for 1 week  Do NOT drive for 48 hours after surgery. No driving 24 hours after taking certain prescription pain medications . Examples of such medication are Percocet, Darvocet, Oxycodone, Tylenol #3, and Tylenol with Codeine. DIET  You may advance your diet as tolerated. After surgery and general anesthesia it is advisable to start with small meals and advance slowly to your regular diet. MEDICATIONS:  You may resume your home medication  You will be given oxycodone for pain control for a few days. Use only if pain not controlled with Tylenol. Dispose of any additional oxycodone pills at your [place] may shower and get incisions wet 2 days after surgery. No soaking tub baths or swimming for 30 days after surgery. Keep abdominal area and incisions clean. Use soap and water to create a good lather and rinse off. Do not scrub incisions. If you have a drain, empty the drain as the nurses instructed. FOLLOW-UP APPOINTMENT should be made for one week after discharge. Call surgeon’s office at [phone] to schedule an appointment.     CALL YOUR DOCTOR FOR:  pain not controlled by pain medications, a temperature greater than 101.5° F, any increase or change in drainage or redness from any incision, any vomiting or inability to keep liquids down, shortness of breath, shoulder pain, or bleeding

## 2023-09-28 NOTE — PROGRESS NOTES
Progress Note - Bariatric Surgery   Silvana Montgomery 79 y.o. female MRN: [id number]  Unit/Bed#: E5 -01 Encounter: [id number]      Subjective/Objective     Subjective: Patient is now s/p diagnostic laparoscopy and lysis of adhesions for ashlee limb obstruction. She is doing very well and tolerating a liquid diet without issues. Denies nausea/vomiting and denies abdominal pain. Ambulating without issues. Objective:    BP (!) 117/45   Pulse 86   Temp 97.9 °F (36.6 °C)   Resp 20   Ht 5' (1.524 m)   Wt 98.9 kg (218 lb)   SpO2 95%   BMI 42.58 kg/m²       Intake/Output Summary (Last 24 hours) at 9/28/2023 0833  Last data filed at 9/28/2023 0029  Gross per 24 hour   Intake 600 ml   Output 20 ml   Net 580 ml       Invasive Devices     Peripheral Intravenous Line  Duration           Peripheral IV 09/27/23 Left Antecubital <1 day                ROS: 10-point system completed. All negative except see HPI.     Physical Exam    General Appearance:    Alert, cooperative, no distress, appears stated age   Head:    Normocephalic, without obvious abnormality, atraumatic   Lungs:     Respirations unlabored   Heart:    Regular rate and rhythm   Abdomen:     Soft, appropriate tenderness, no masses, no organomegaly, non-distended   Extremities:   Extremities normal, atraumatic, no cyanosis or edema   Neurologic:  Incision:  Psych:   Normal strength and sensation    Clean, dry, and intact    Normal mood and affect       Lab, Imaging and other studies:  CBC:   Lab Results   Component Value Date    WBC 9.18 09/28/2023    HGB 9.1 (L) 09/28/2023    HCT 31.9 (L) 09/28/2023    MCV 79 (L) 09/28/2023     09/28/2023    RBC 4.02 09/28/2023    MCH 22.6 (L) 09/28/2023    MCHC 28.5 (L) 09/28/2023    RDW 21.6 (H) 09/28/2023    MPV 11.9 09/28/2023    NRBC 0 09/27/2023        VTE Mechanical Prophylaxis: sequential compression device    Assessment/Plan  1)  Patient with history of gastric bypass in 2015 who was transferred from [facility] for abdominal pain with nausea and vomiting with evidence of ashlee limb obstruction and edema on CT scan. Now s/p diagnostic laparoscopy and lysis of adhesions    - Encourage PO intake. Advance diet as tolerated  - Pain and nausea control as needed  - DVT prophylaxis  - Will discuss with staff regarding home going today    Plan of care was discussed with patient.   Care plan discussed with Dr. Abdias Vasquez MD  Bariatric Surgery  9/28/2023  8:33 AM

## 2023-09-28 NOTE — EMTALA/ACUTE CARE TRANSFER
[address] [address]  Dept: [phone]      EMTALA TRANSFER CONSENT    NAME Dianne Tarango                                         1955                              MRN [id number]    I have been informed of my rights regarding examination, treatment, and transfer   by Dr. Arcelia Salinas DO    Benefits: Specialized equipment and/or services available at the receiving facility (Include comment)________________________    Risks: Potential for delay in receiving treatment      Consent for Transfer:  I acknowledge that my medical condition has been evaluated and explained to me by the emergency department physician or other qualified medical person and/or my attending physician, who has recommended that I be transferred to the service of  Accepting Physician: Dr Ramírez Flanagan at [address] [facility] [address] Name, [facility] : [facility]. The above potential benefits of such transfer, the potential risks associated with such transfer, and the probable risks of not being transferred have been explained to me, and I fully understand them. The doctor has explained that, in my case, the benefits of transfer outweigh the risks. I agree to be transferred. I authorize the performance of emergency medical procedures and treatments upon me in both transit and upon arrival at the receiving facility. Additionally, I authorize the release of any and all medical records to the receiving facility and request they be transported with me, if possible. I understand that the safest mode of transportation during a medical emergency is an ambulance and that the Hospital advocates the use of this mode of transport. Risks of traveling to the receiving facility by car, including absence of medical control, life sustaining equipment, such as oxygen, and medical personnel has been explained to me and I fully understand them.     ([address])  [  ]  I consent to the stated transfer and to be transported by ambulance/helicopter. [  ]  I consent to the stated transfer, but refuse transportation by ambulance and accept full responsibility for my transportation by car. I understand the risks of non-ambulance transfers and I exonerate the Hospital and its staff from any deterioration in my condition that results from this refusal.    X___________________________________________    DATE  23  TIME________  Signature of patient or legally responsible individual signing on patient behalf           RELATIONSHIP TO PATIENT_________________________          Provider Certification    NAME Tao Elmore                                         1955                              MRN [id number]    A medical screening exam was performed on the above named patient. Based on the examination:    Condition Necessitating Transfer The primary encounter diagnosis was Abdominal pain. A diagnosis of Obstruction concurrent with and due to internal hernia of abdomen was also pertinent to this visit.     Patient Condition: The patient has been stabilized such that within reasonable medical probability, no material deterioration of the patient condition or the condition of the unborn child(tyler) is likely to result from the transfer    Reason for Transfer: Level of Care needed not available at this facility (Bariatric surgery)    Transfer Requirements: [facility]   · Space available and qualified personnel available for treatment as acknowledged by Jame Lopez  · Agreed to accept transfer and to provide appropriate medical treatment as acknowledged by       Dr Maris Rodriguez  · Appropriate medical records of the examination and treatment of the patient are provided at the time of transfer   [address] _______  · Transfer will be performed by qualified personnel from    and appropriate transfer equipment as required, including the use of necessary and appropriate life support measures. Provider Certification: I have examined the patient and explained the following risks and benefits of being transferred/refusing transfer to the patient/family:  General risk, such as traffic hazards, adverse weather conditions, rough terrain or turbulence, possible failure of equipment (including vehicle or aircraft), or consequences of actions of persons outside the control of the transport personnel, Unanticipated needs of medical equipment and personnel during transport, Risk of worsening condition, The possibility of a transport vehicle being unavailable      Based on these reasonable risks and benefits to the patient and/or the unborn child(tyler), and based upon the information available at the time of the patient’s examination, I certify that the medical benefits reasonably to be expected from the provision of appropriate medical treatments at another medical facility outweigh the increasing risks, if any, to the individual’s medical condition, and in the case of labor to the unborn child, from effecting the transfer.     X____________________________________________ DATE 09/27/23        TIME_______      ORIGINAL - SEND TO MEDICAL RECORDS   COPY - SEND WITH PATIENT DURING TRANSFER

## 2023-09-28 NOTE — OP NOTE
OPERATIVE REPORT  PATIENT NAME: Marino Welch    :  1955  MRN: [id number]  Pt Location: [facility] OR ROOM 07    SURGERY DATE: 2023    Surgeon(s) and Role:     * Arlyn Ortiz MD - Primary     * Tina Cruz MD - Fellow    Preop Diagnosis:  Internal hernia [K45.8]    Post-Op Diagnosis Codes:     * Internal hernia [K45.8]    Procedure(s):  LAPAROSCOPY DIAGNOSTIC, LYSIS OF ADHESIONS WITH REDUCTION OF INTERNAL HERNIA, INTRAOPERATIVE EGD     Specimen(s):  * No specimens in log *    Estimated Blood Loss:   20 mL    Drains:  * No LDAs found *    Anesthesia Type:   General    Operative Indications:  Internal hernia [K45.8]      Operative Findings:  Omental adhesions creating an internal hernia with closed loop bowel obstruction at the distal ashlee limb    Intermesenteric space and Olson's space closed     No marginal ulcers identified on intraoperative EGD     Complications:   None    Procedure and Technique:  The patient was identified by name, armband, and conversation. The patient was then brought to the operative theater. After successful induction of general anesthesia the patient was prepped and draped in the usual sterile fashion. A timeout was performed and all were in agreement. Veress needle was inserted at Ceron's point and preinsufflation was achieved to 10 mmHg. Optiview technique was then used to gain entrance into the abdomen at Ceron's point with a 5 mm 0 degree laparoscope. No injuries were identified. The abdomen was then set to insufflation pressure of 15 mmHg. Three 5 mm ports were then placed near the umbilicus in the right and left lower quadrant. A four-quadrant Exparel-Marcaine transversus abdominis plane block was then performed. Our attention was immediately turned to the distal Ashlee limb as there was ascites present. We began to run the Aslhee limb and noted that the band of omentum was creating an obstruction. This omental band was lysed with the harmonic.   We then continued to run the remainder of the distal Ilia limb and noticed another omental band was kinking the Ilia limb. This omental band was also lysed with the harmonic. And lysing both bands the twist and kinking of the Ilia limb were alleviated. The biliopancreatic limb and the common channel were examined. An intraoperative EGD was then performed. No marginal ulceration was noted. Feculent type material was noted in the distal Ilia limb with undigested food and medications. All port sites were examined for bleeding prior to exiting the abdomen to ensure hemostasis. The port sites were then closed with Monocryl and Dermabond. I was present for the entire procedure., A qualified resident physician was not available. and A bariatric surgery fellow was required during the procedure for retraction, tissue handling, dissection and suturing, traction/countertraction and performance of the intraoperative EGD.     Patient Disposition:  extubated and stable    This procedure was not performed to treat colon cancer through resection      SIGNATURE: Cherelle Butler MD  DATE: September 28, 2023  TIME: 12:29 AM

## 2023-09-28 NOTE — ANESTHESIA POSTPROCEDURE EVALUATION
Post-Op Assessment Note    CV Status:  Stable    Pain management: adequate     Mental Status:  Alert and awake   Hydration Status:  Euvolemic   PONV Controlled:  Controlled   Airway Patency:  Patent      Post Op Vitals Reviewed: Yes      Staff: Anesthesiologist         No notable events documented.     BP      Temp      Pulse     Resp      SpO2      /75   Pulse 78   Temp 97.9 °F (36.6 °C)   Resp 20   SpO2 98%

## 2023-09-28 NOTE — ANESTHESIA PREPROCEDURE EVALUATION
Procedure:  LAPAROSCOPY DIAGNOSTIC (Abdomen)    Relevant Problems   CARDIO   (+) Hypertension      GI/HEPATIC   (+) H/O bariatric surgery      HEMATOLOGY   (+) Anemia      Other   (+) Diverticulitis   (+) Morbid obesity with BMI of 40.0-44.9, adult [facility])        Physical Exam    Airway    Mallampati score: III  TM Distance: >3 FB  Neck ROM: full     Dental   No notable dental hx     Cardiovascular  Rhythm: regular, Rate: normal, Cardiovascular exam normal    Pulmonary  Pulmonary exam normal Breath sounds clear to auscultation,     Other Findings        Anesthesia Plan  ASA Score- 3     Anesthesia Type- general with ASA Monitors. Additional Monitors:   Airway Plan: ETT. Plan Factors-    Chart reviewed. EKG reviewed. Imaging results reviewed. Existing labs reviewed. Patient summary reviewed. Induction- intravenous and rapid sequence induction. Postoperative Plan- Plan for postoperative opioid use. Planned trial extubation    Informed Consent- Anesthetic plan and risks discussed with patient. I personally reviewed this patient with the CRNA. Discussed and agreed on the Anesthesia Plan with the CRNA. Bee Al

## 2023-09-28 NOTE — DISCHARGE SUMMARY
Discharge Summary - Tavo Mcduffie 79 y.o. female MRN: [id number]    Unit/Bed#: E5 -01 Encounter: [id number]      Pre-Operative Diagnosis: Pre-Op Diagnosis Codes:     * Internal hernia [K45.8]    Post-Operative Diagnosis: Post-Op Diagnosis Codes:     * Internal hernia [K45.8]    Procedures Performed:  Procedure(s):  LAPAROSCOPY DIAGNOSTIC; LYSIS OF ADHESIONS; REDUCTION OF INTERNAL HERNIA; INTRAOPERATIVE EGD    Surgeon: Sam Kam MD    See H & P for full details of admission and Operative Note for full details of operations performed. Patient tolerated surgery well without complications. In the morning postoperative Day 1, the patient had minimal to no abdominal pain or nausea. Tolerated a clear liquid diet without vomiting. Able to ambulate and voiding independently. Patient was deemed ready for discharge home. Patient was seen and examined prior to discharge. Provisions for Follow-Up Care:  See After Visit Summary/Discharge Instructions for information related to follow-up care and home orders. Disposition: Home, in stable condition. Planned Readmission: No    Discharge Medications:  See After Visit Summary/Discharge Instructions for reconciled discharge medications provided to patient and family. Post Operative instructions: Reviewed with patient and/or family.     Signature:   Maurene Bosworth, MD  Date: 9/28/2023 Time: 8:45 AM

## 2023-09-28 NOTE — PLAN OF CARE
Problem: PAIN - ADULT  Goal: Verbalizes/displays adequate comfort level or baseline comfort level  Description: Interventions:  - Encourage patient to monitor pain and request assistance  - Assess pain using appropriate pain scale  - Administer analgesics based on type and severity of pain and evaluate response  - Implement non-pharmacological measures as appropriate and evaluate response  - Consider cultural and social influences on pain and pain management  - Notify physician/advanced practitioner if interventions unsuccessful or patient reports new pain  Outcome: Progressing     Problem: DISCHARGE PLANNING  Goal: Discharge to home or other facility with appropriate resources  Description: INTERVENTIONS:  - Identify barriers to discharge w/patient and caregiver  - Arrange for needed discharge resources and transportation as appropriate  - Identify discharge learning needs (meds, wound care, etc.)  - Arrange for interpretive services to assist at discharge as needed  - Refer to Case Management Department for coordinating discharge planning if the patient needs post-hospital services based on physician/advanced practitioner order or complex needs related to functional status, cognitive ability, or social support system  Outcome: Progressing     Problem: GASTROINTESTINAL - ADULT  Goal: Minimal or absence of nausea and/or vomiting  Description: INTERVENTIONS:  - Administer IV fluids if ordered to ensure adequate hydration  - Maintain NPO status until nausea and vomiting are resolved  - Nasogastric tube if ordered  - Administer ordered antiemetic medications as needed  - Provide nonpharmacologic comfort measures as appropriate  - Advance diet as tolerated, if ordered  - Consider nutrition services referral to assist patient with adequate nutrition and appropriate food choices  Outcome: Progressing

## 2023-09-28 NOTE — H&P
Consultation - Bariatric Surgery   Anita Abraham 79 y.o. female MRN: [id number]  Unit/Bed#: E5 -01 Encounter: [id number]    Assessment/Plan     Assessment:  78 y/o F with a history of laparoscopic RYGB in  who is now admitted as a transfer from OSH ED for abdominal pain for a few days and evidence on CT scan of ashlee limb dilation and edematous small bowel with concern for internal hernia at the [address]:  -NPO  -IVF  -IV abx on call to OR  -DVT prophylaxis  -Preop labs  -Pain and nausea control  -Plan for OR tonight    History of Present Illness     HPI:  Anita Abraham is a 79 y.o. female with a history of gastric bypass in  at [facility] who presented to the ED earlier today with new onset abdominal pain since Monday. Pain seems to be coming in waves and is not pinpointed to one area of the abdomen. This was also associated with nausea and vomiting. In OSH ED she was found to be in stable condition with normal WBC. CT scan was done showing evidence of a dilated ashlee limb and edematous bowel and mesentery all the way down to the [address]. Concern is for internal hernia. Decision was made to transfer the [facility] to [facility] [place] [facility]oscopy. Review of Systems   Constitutional: Positive for appetite change. Negative for activity change. HENT: Negative for congestion. Respiratory: Negative for shortness of breath. Cardiovascular: Negative for chest pain. Gastrointestinal: Positive for abdominal pain, nausea and vomiting. Skin: Negative for color change. Neurological: Negative for dizziness.        Historical Information   Past Medical History:   Diagnosis Date   • Anemia    • Diverticulitis    • Hypertension      Past Surgical History:   Procedure Laterality Date   • CARPAL TUNNEL RELEASE     •  SECTION     • GASTRIC BYPASS     • HYSTERECTOMY      TOTAL     Social History   Social History     Substance and Sexual Activity   Alcohol Use Never     Social History Substance and Sexual Activity   Drug Use Never     Social History     Tobacco Use   Smoking Status Never   Smokeless Tobacco Not on file   Tobacco Comments    NO TOBACCO USE     Family History: non-contributory    Meds/Allergies   all current active meds have been reviewed  No Known Allergies    Objective   First Vitals:        Current Vitals:        No intake or output data in the 24 hours ending 09/27/23 2120    Invasive Devices     Peripheral Intravenous Line  Duration           Peripheral IV 09/27/23 Right Antecubital <1 day                Physical Exam  Constitutional:       Appearance: Normal appearance. HENT:      Head: Normocephalic. Cardiovascular:      Rate and Rhythm: Normal rate and regular rhythm. Pulses: Normal pulses. Pulmonary:      Effort: Pulmonary effort is normal.      Breath sounds: Normal breath sounds. Abdominal:      Palpations: Abdomen is soft. Comments: Mildly tender at the epigastric region during examination   Skin:     General: Skin is warm. Neurological:      General: No focal deficit present. Mental Status: She is alert and oriented to person, place, and time. Lab Results:   CBC:   Lab Results   Component Value Date    WBC 8.68 09/27/2023    HGB 10.2 (L) 09/27/2023    HCT 35.7 09/27/2023    MCV 79 (L) 09/27/2023     09/27/2023    RBC 4.51 09/27/2023    MCH 22.6 (L) 09/27/2023    MCHC 28.6 (L) 09/27/2023    RDW 21.6 (H) 09/27/2023    MPV 12.1 09/27/2023    NRBC 0 09/27/2023   , CMP:   Lab Results   Component Value Date    SODIUM 140 09/27/2023    K 3.6 09/27/2023     09/27/2023    CO2 25 09/27/2023    BUN 12 09/27/2023    CREATININE 0.58 (L) 09/27/2023    CALCIUM 9.4 09/27/2023    AST 15 09/27/2023    ALT 10 09/27/2023    ALKPHOS 93 09/27/2023    EGFR 96 09/27/2023     Imaging: I have personally reviewed pertinent reports. EKG, Pathology, and Other Studies: I have personally reviewed pertinent reports.        CT 9/27/2023:  FINDINGS:     CHEST     LUNGS:  Lungs are clear. There is no tracheal or endobronchial lesion.     PLEURA:  Unremarkable.     HEART/GREAT VESSELS: Heart is unremarkable for patient's age. No thoracic aortic aneurysm.     MEDIASTINUM AND PATRICE:  Unremarkable.     CHEST WALL AND LOWER NECK:  Unremarkable.     ABDOMEN     LIVER/BILIARY TREE:  Unremarkable.     GALLBLADDER:  No calcified gallstones. No pericholecystic inflammatory change.     SPLEEN:  Unremarkable.     PANCREAS:  Unremarkable.     ADRENAL GLANDS:  Unremarkable.     KIDNEYS/URETERS:  No hydronephrosis or urinary tract calculus. One or more sharply circumscribed subcentimeter renal hypodensities are present, too small to accurately characterize, and statistically most likely benign findings. According to recent   literature (Radiology 2019) no further workup of these findings is recommended.     STOMACH AND BOWEL:  There is colonic diverticulosis without evidence of acute diverticulitis. Status post gastric bypass.     Marked dilation of the Ilia limb up to 4.4 cm. An approximately 15 cm loop of patient's Ilia limb immediately proximal to the jejunojejunostomy is interposed by 2 transition points which abut each other best appreciated on coronal images 38-50. Mesenteric engorgement and interloop fluid also noted     APPENDIX:  No findings to suggest appendicitis.     ABDOMINOPELVIC CAVITY:  No ascites. No pneumoperitoneum.   No lymphadenopathy.     VESSELS:  Unremarkable for patient's age.     PELVIS     REPRODUCTIVE ORGANS:  Surgical changes of prior hysterectomy.     URINARY BLADDER:  Unremarkable.     ABDOMINAL WALL/INGUINAL REGIONS:  Unremarkable.     OSSEOUS STRUCTURES:  No acute fracture or destructive osseous lesion.     IMPRESSION:     Findings concerning for closed-loop obstruction/internal hernia involving an approximately 15 cm segment of Ilia limb immediately proximal to patient's jejunojejunostomy.     Findings discussed with Dr. Jayla Woo at  pm, 9/27/23

## 2023-09-28 NOTE — RESTORATIVE TECHNICIAN NOTE
Restorative Technician Note      Patient Name: Kraig Goldman Tech Visit Date: 09/28/23  Note Type: Mobility  Patient Position Upon Consult: Supine  Activity Performed: Range of motion  Patient Position at End of Consult: Supine;  All needs within reach

## 2023-09-29 ENCOUNTER — TELEPHONE (OUTPATIENT)
Dept: BARIATRICS | Facility: CLINIC | Age: 68
End: 2023-09-29

## 2023-09-29 ENCOUNTER — TELEPHONE (OUTPATIENT)
Dept: MEDSURG UNIT | Facility: HOSPITAL | Age: 68
End: 2023-09-29

## 2023-09-29 NOTE — TELEPHONE ENCOUNTER
Pt had sx for hernia on 9/27 by Dr Angelia Carrillo in [facility]. She is needing a post op appt but wants [facility]. I left a message for her adv Dr Angelia Carrillo is [facility], but if she wants [facility], then she would be seeing someone else for followup. Jordi Feldman both phone numbers.

## 2023-10-02 ENCOUNTER — TELEPHONE (OUTPATIENT)
Dept: MEDSURG UNIT | Facility: HOSPITAL | Age: 68
End: 2023-10-02

## 2023-10-02 NOTE — TELEPHONE ENCOUNTER
Post op follow up phone call completed. Pt is tolerating diet. Mild discomfort only using tylenol. Using IS as instructed, reinforced importance of IS to avoid pneumonia. Passing gas. Had normal BM. Follow up appt scheduled for this week. Pt stated understanding of d/c instructions. Instructed to call with any questions or concerns.

## 2023-10-05 ENCOUNTER — OFFICE VISIT (OUTPATIENT)
Dept: BARIATRICS | Facility: CLINIC | Age: 68
End: 2023-10-05
Payer: MEDICARE

## 2023-10-05 VITALS
BODY MASS INDEX: 43.24 KG/M2 | SYSTOLIC BLOOD PRESSURE: 120 MMHG | TEMPERATURE: 97.7 F | HEIGHT: 59 IN | HEART RATE: 77 BPM | WEIGHT: 214.5 LBS | DIASTOLIC BLOOD PRESSURE: 80 MMHG

## 2023-10-05 DIAGNOSIS — Z98.84 BARIATRIC SURGERY STATUS: ICD-10-CM

## 2023-10-05 DIAGNOSIS — Z48.815 ENCOUNTER FOR SURGICAL AFTERCARE FOLLOWING SURGERY OF DIGESTIVE SYSTEM: Primary | ICD-10-CM

## 2023-10-05 DIAGNOSIS — E66.01 OBESITY, CLASS III, BMI 40-49.9 (MORBID OBESITY) (HCC): ICD-10-CM

## 2023-10-05 PROCEDURE — 99213 OFFICE O/P EST LOW 20 MIN: CPT | Performed by: NURSE PRACTITIONER

## 2023-10-05 NOTE — PROGRESS NOTES
Assessment/Plan:     Patient ID: Leonor Linton is a 79 y.o. female. Bariatric Surgery Status    -s/p Ilia-En-Y Gastric Bypass  at [facility] in 2015. Presents to the office today for hospitalization follow up for abdominal pain - she was found to have an internal hernia and underwent LAPAROSCOPY DIAGNOSTIC, LYSIS OF ADHESIONS WITH REDUCTION OF INTERNAL HERNIA, INTRAOPERATIVE EGD with Dr. Ramirez Hyde on 09/27/2023. Since her surgery, she has overall been doing well, tolerating a regular diet. Denies having any abdominal pain, N/V/D/C, regurgitation, reflux, or dysphagia. She is currently not interested in establishing routine bariatric post-op care due to busy schedule. PLAN:     - Routine follow up in 6 months for post op follow up for internal hernia. - Continue with healthy lifestyle, adequate protein intake of 60 gm, fluid intake of at least 64 oz. - Continue with MVI daily. - Activity as tolerated. - Follow up with RD and SW as needed.          · Continued/Maintain healthy weight loss with good nutrition intakes. · Adequate hydration with at least 64oz. fluid intake. · Follow diet as discussed. Follow vitamin and mineral recommendations as reviewed with you. Exercise as tolerated. · Follow-up in 6 months for post op care. We kindly ask that your arrive 15 minutes before your scheduled appointment time with your provider to allow our staff to room you, get your vital signs and update your chart. · Call our office if you have any problems with abdominal pain especially associated with fever, chills, nausea, vomiting or any other concerns. · All  Post-bariatric surgery patients should be aware that very small quantities of any alcohol can cause impairment and it is very possible not to feel the effect. The effect can be in the system for several hours. It is also a stomach irritant.      · It is advised to AVOID alcohol, Nonsteroidal antiinflammatory drugs (NSAIDS) and nicotine of all forms . Any of these can cause stomach irritation/pain. · Discussed the effects of alcohol on a bariatric patient and the increased impairment risk. · Keep up the good work! Diagnoses and all orders for this visit:    Encounter for surgical aftercare following surgery of digestive system    Bariatric surgery status    Obesity, Class III, BMI 40-49.9 (morbid obesity) ([address] )         Subjective:      Patient ID: Radha Joshua is a 79 y.o. female. -s/p Ilia-En-Y Gastric Bypass  at [facility] in 2015. Presents to the office today for hospitalization follow up for abdominal pain - she was found to have an internal hernia and underwent LAPAROSCOPY DIAGNOSTIC, LYSIS OF ADHESIONS WITH REDUCTION OF INTERNAL HERNIA, INTRAOPERATIVE EGD with Dr. Alireza Palacio on 09/27/2023. Since her surgery, she has overall been doing well, tolerating a regular diet. Denies having any abdominal pain, N/V/D/C, regurgitation, reflux, or dysphagia. She is currently not interested in establishing routine bariatric post-op care due to busy schedule. · Tolerating a regular diet-yes  · Eating at least 60 grams of protein per day-yes  · Following 30/60 minute rule with liquids-no  · Drinking at least 64 ounces of fluid per day-yes  · Drinking carbonated beverages-no  · Sufficient exercise-yes  · Using NSAIDs regularly-no  · Using nicotine-no  · Using alcohol-no  · Supplements: Multivitamins - recommended her to take bariatric multivitamin     The following portions of the patient's history were reviewed and updated as appropriate: allergies, current medications, past family history, past medical history, past social history, past surgical history and problem list.    Review of Systems   Constitutional: Negative. Respiratory: Negative. Cardiovascular: Negative. Gastrointestinal: Negative.           Objective:    /80   Pulse 77   Temp 97.7 °F (36.5 °C) (Tympanic)   Ht 4' 11.2" (1.504 m)   Wt 97.3 kg (214 lb 8 oz)   BMI 43.03 kg/m²      Physical Exam  Vitals and nursing note reviewed. Constitutional:       Appearance: Normal appearance. She is obese. Cardiovascular:      Rate and Rhythm: Normal rate and regular rhythm. Pulses: Normal pulses. Heart sounds: Normal heart sounds. Pulmonary:      Effort: Pulmonary effort is normal.      Breath sounds: Normal breath sounds. Abdominal:      General: Bowel sounds are normal.      Palpations: Abdomen is soft. Tenderness: There is no abdominal tenderness. Comments: All incisions healing well without any signs of infection. Small circular ecchymotic lesion low-abdominal region - likely from lovenox/heparin injection - reassured patient this will fade away and self-absorb. Musculoskeletal:         General: Normal range of motion. Skin:     General: Skin is warm and dry. Neurological:      General: No focal deficit present. Mental Status: She is alert and oriented to person, place, and time. Psychiatric:         Mood and Affect: Mood normal.         Behavior: Behavior normal.         Thought Content:  Thought content normal.         Judgment: Judgment normal.

## (undated) DEVICE — ALLENTOWN LAP CHOLE APP PACK: Brand: CARDINAL HEALTH

## (undated) DEVICE — GLOVE SRG BIOGEL 7

## (undated) DEVICE — CHLORAPREP HI-LITE 26ML ORANGE

## (undated) DEVICE — BLUE HEAT SCOPE WARMER

## (undated) DEVICE — TUBING SUCTION 5MM X 12 FT

## (undated) DEVICE — METZENBAUM ADTEC SINGLE USE DISSECTING SCISSORS, SHAFT ONLY, MONOPOLAR, CURVED TO LEFT, WORKING LENGTH: 12 1/4", (310 MM), DIAM. 5 MM, INSULATED, DOUBLE ACTION, STERILE, DISPOSABLE, PACKAGE OF 10 PIECES: Brand: AESCULAP

## (undated) DEVICE — TROCAR: Brand: KII FIOS FIRST ENTRY

## (undated) DEVICE — SUT MONOCRYL 4-0 PS-2 27 IN Y426H

## (undated) DEVICE — NEEDLE SPINAL18G X 3.5 IN QUINCKE

## (undated) DEVICE — GLOVE INDICATOR PI UNDERGLOVE SZ 7 BLUE

## (undated) DEVICE — TROCAR: Brand: KII® SLEEVE

## (undated) DEVICE — VISUALIZATION SYSTEM: Brand: CLEARIFY

## (undated) DEVICE — INTENDED FOR TISSUE SEPARATION, AND OTHER PROCEDURES THAT REQUIRE A SHARP SURGICAL BLADE TO PUNCTURE OR CUT.: Brand: BARD-PARKER SAFETY BLADES SIZE 15, STERILE

## (undated) DEVICE — GLOVE SRG BIOGEL 7.5

## (undated) DEVICE — GLOVE SRG BIOGEL 6.5

## (undated) DEVICE — HARMONIC 1100 SHEARS, 36CM SHAFT LENGTH: Brand: HARMONIC

## (undated) DEVICE — Device